# Patient Record
Sex: MALE | Race: WHITE | URBAN - METROPOLITAN AREA
[De-identification: names, ages, dates, MRNs, and addresses within clinical notes are randomized per-mention and may not be internally consistent; named-entity substitution may affect disease eponyms.]

---

## 2018-10-29 ENCOUNTER — HISTORICAL (OUTPATIENT)
Dept: ADMINISTRATIVE | Facility: HOSPITAL | Age: 73
End: 2018-10-29

## 2022-04-30 NOTE — OP NOTE
DATE OF SURGERY:        SURGEON:  Eugenio Wyman MD    PREOPERATIVE DIAGNOSIS:  Nonclearing vitreous hemorrhage of the left eye.    POSTOPERATIVE DIAGNOSIS:  Nonclearing vitreous hemorrhage of the left eye with retinal tears.    PROCEDURES PERFORMED:  Pars plana vitrectomy with examination under anesthesia, laser photocoagulation of retinal tears and fluid-air exchange, all to the left eye.    ASSISTANT:  Alexia Gillette.    ANESTHESIA:  General.    ESTIMATED BLOOD LOSS:  Less than 5 mL.    COMPLICATIONS:  None.    PROCEDURE INDICATIONS:  Mr. Romo has a history of a nonclearing vitreous hemorrhage of the left eye of unknown etiology.  It is possible that he has retinal tears and requires a vitrectomy with examination under anesthesia.    PROCEDURE DESCRIPTION:  The patient was taken to the operative theater where general anesthesia was begun.  The left eye was prepped and draped in the normal sterile fashion and a lid speculum was applied.  A standard 3-port 25 gauge pars plana vitrectomy was performed with all trocars being placed 3.5 mm from the surgical limbus.  A core vitrectomy was performed removing a central vitreous hemorrhage.  A posterior vitreous detachment already existed and the posterior hyaloid was removed out to the peripheral retina shaving the vitreous base in 360 degrees.  Two retinal tears were identified.  One was superior nasal and 1 was superior temporal.  Both were treated with endolaser.  A fluid-air exchange was performed for retinal detachment prophylaxis.  All instruments were removed from the eye and all sclerotomies massaged closed with cotton-tip swabs.  Several drops of TobraDex ophthalmic solution were applied to the eye.  The postoperative intraocular pressure was 15 mmHg.  The lid speculum and eye drape were then removed and the eye was covered with a gauze patch and a Goodman shield.  The patient was then transported to the postoperative care unit to recover.    DISCHARGE  CONDITION:  Good.    DISPOSITION:  Home.    FOLLOWUP:  With Dr. Wyman the following day.       This patient tolerated the procedure well.        ______________________________  MD DAVID Hampton/COLTON  DD:  11/27/2018  Time:  04:38PM  DT:  11/27/2018  Time:  05:05PM  Job #:  [id number]